# Patient Record
Sex: FEMALE | Race: WHITE
[De-identification: names, ages, dates, MRNs, and addresses within clinical notes are randomized per-mention and may not be internally consistent; named-entity substitution may affect disease eponyms.]

---

## 2017-03-22 ENCOUNTER — HOSPITAL ENCOUNTER (OUTPATIENT)
Dept: HOSPITAL 45 - C.GI | Age: 27
Discharge: HOME | End: 2017-03-22
Attending: INTERNAL MEDICINE
Payer: COMMERCIAL

## 2017-03-22 VITALS
WEIGHT: 156.33 LBS | BODY MASS INDEX: 26.05 KG/M2 | BODY MASS INDEX: 26.05 KG/M2 | WEIGHT: 156.33 LBS | HEIGHT: 65 IN | HEIGHT: 65 IN | BODY MASS INDEX: 26.05 KG/M2

## 2017-03-22 VITALS — OXYGEN SATURATION: 100 % | HEART RATE: 76 BPM | DIASTOLIC BLOOD PRESSURE: 67 MMHG | SYSTOLIC BLOOD PRESSURE: 113 MMHG

## 2017-03-22 DIAGNOSIS — K62.5: Primary | ICD-10-CM

## 2017-03-22 DIAGNOSIS — K64.8: ICD-10-CM

## 2017-03-22 NOTE — GI REPORT
Procedure Date: 3/22/2017 8:46 AM

Procedure:            Colonoscopy

Indications:          Rectal bleeding

Medicines:            Monitored Anesthesia Care

Complications:        No immediate complications.

Estimated Blood Loss: Estimated blood loss: none.

Procedure:            Pre-Anesthesia Assessment:

                      - Prior to the procedure, a History and Physical was 

                      performed, and patient medications and allergies were 

                      reviewed. The patient's tolerance of previous 

                      anesthesia was also reviewed. The risks and benefits of 

                      the procedure and the sedation options and risks were 

                      discussed with the patient. All questions were 

                      answered, and informed consent was obtained. Prior 

                      Anticoagulants: The patient has taken no previous 

                      anticoagulant or antiplatelet agents. ASA Grade 

                      Assessment: I - A normal, healthy patient. After 

                      reviewing the risks and benefits, the patient was 

                      deemed in satisfactory condition to undergo the 

                      procedure.

                      After I obtained informed consent, the scope was passed 

                      under direct vision. Throughout the procedure, the 

                      patient's blood pressure, pulse, and oxygen saturations 

                      were monitored continuously. The scope was introduced 

                      through the anus and advanced to the terminal ileum. 

                      The colonoscopy was performed without difficulty. The 

                      patient tolerated the procedure well. The quality of 

                      the bowel preparation was good. The terminal ileum, 

                      ileocecal valve, appendiceal orifice, and rectum were 

                      photographed.

Findings:

     Non-bleeding internal hemorrhoids were found during retroflexion. The 

     hemorrhoids were small.

     The exam was otherwise without abnormality.

Impression:           - Non-bleeding internal hemorrhoids.

                      - The examination was otherwise normal.

                      - No specimens collected.

Recommendation:       - Resume previous diet.

                      - Continue present medications.

                      - Repeat colonoscopy at age 49 for surveillance.

                      - Return to primary care physician as previously 

                      scheduled.

Garett Amaro DO

3/22/2017 9:06:08 AM

This report has been signed electronically.

Note Initiated On: 3/22/2017 8:46 AM

     I attest to the content of the Intraoperative Record and orders 

     documented therein, exceptions below

## 2017-03-22 NOTE — DISCHARGE INSTRUCTIONS
Endoscopy Patient Instructions


Date / Procedure(s) Performed


Mar 22, 2017.


Colonoscopy





Allergy Information


Coded Allergies:  


     No Known Allergies (Verified , 3/22/17)





Discharge Date / Findings


Mar 22, 2017.


Internal hemorrhoids





Medication Instructions


OK to resume all medications today as prescribed


 Reported Home Medications








 Medications  Dose


 Route/Sig


 Max Daily Dose Days Date Category


 


 Probiotic


  (Probiotic


 Product) 1 Tab Tab  1 Tab


 PO QPM


    3/13/17 Reported


 


 Multivitamin


  (Multivitamins)


 Tab  1 Tab


 PO QPM


    3/13/17 Reported











Provider Instructions





Activity Restrictions





-  No exercising or heavy lifting for 24 hours. 


-  Do not drink alcohol the day of the procedure.


-  Do not drive a car or operate machinery until the day after the procedure.


-  Do not make any important decisions or sign important papers in 24 hours 

after the procedure.





Following Day:





-  Return to full activity which may include returning to work/school.





Diet





Start your diet with liquids and light foods (jello, soup, juice, toast).  Then 

eat your usual diet if not nauseated.





Treatment For Common After Affects





For mild abdominal pain, bloating, or excessive gas:





-  Rest


-  Eat lightly


-  Lie on right side





Follow-Up Information


Follow-up with DR. VALDO MORELAND as scheduled





Anesthesia Information





What You Should Know





You have had a procedure that required some medicine to reduce anxiety and 

discomfort. This treatment is called moderate sedation.  


After receiving the treatment, you may be sleepy, but you will be able to 

breathe on your own.  The effects of the treatment may last for several hours.








Follow these instructions along with Activity/Diet recommendations noted above:





*  Do NOT do anything where dizziness or clumsiness would be dangerous.





*  Rest quietly at home today, then you can be up and about tomorrow.





*  Have a responsible person stay with you the rest of today.





*  You may have had an I.V. today.  If so, you may take the dressing off later 

today.





Recommendations


 


Call your doctor if:





*  Trouble breathing 





*  Continuous vomiting for more than 24 hours





*  Temperature above 101 degrees





*  Severe abdominal pain or bloating





*  Pain not relieved by pain medicine ordered





*  There is increased drainage or redness from any incision





*  A large amount of rectal bleeding greater than 2-3 tablespoons. 


   (If you had a polyp/s removed or have hemorrhoids, a small amount of blood -


    from the rectum is to be expected.)





*  You have any unanswered questions or concerns.





IN THE EVENT OF A SERIOUS EMERGENCY, GO TO THE NEAREST EMERGENCY ROOM





       Your discharge instructions were prepared by provider Garett Amaro.


 Patient Instructions Signature Page








Crystal Quresih 











Patient (or Guardian) Signature/Date:____________________________________ I 

have read and understand the instructions given to me by my caregivers.








Caregiver/RN/Doctor Signature/Date:____________________________________








The above-named patient and/or guardian has received patient instructions on 

this date.


























+  Original Patient Signature Page (only) stays with chart.  Please make copy 

for patient.

## 2017-03-22 NOTE — ENDO HISTORY AND PHYSICAL
History & Physical


Date of Service:


Mar 22, 2017.


Chief Complaint:


BLOOD IN STOOL, GROSS BLOOD ON DIGITAL IMAGE


Referring Physician:


DR. VALDO MORELAND


History of Present Illness


25 yo CF who presents for colonoscopy secondary to blood in stool.





Past Surgical History


Hx Cardiac Surgery:  No


Hx Internal Defibrillator:  No


Hx Pacemaker:  No


Hx Abdominal Surgery:  No


Hx of Implantable Prosthesis:  No


Hx Post-Op Nausea and Vomiting:  No


Hx Cancer Surgery:  No


Hx Thoracic Surgery:  No


Hx Orthopedic:  No


Hx Urinary Tract Surgery:  No





Family History


Polyp





Social History


Smoking Status:  Never Smoker


Hx Substance Use:  No


Hx Alcohol Use:  No





Allergies


Coded Allergies:  


     No Known Allergies (Verified , 3/22/17)





Current Medications





 Reported Home Medications








 Medications  Dose


 Route/Sig


 Max Daily Dose Days Date Category


 


 Probiotic


  (Probiotic


 Product) 1 Tab Tab  1 Tab


 PO QPM


    3/13/17 Reported


 


 Multivitamin


  (Multivitamins)


 Tab  1 Tab


 PO QPM


    3/13/17 Reported











Vital Signs


Weight (Kilograms):  70.91


Height (Feet):  5


Height (Inches):  5











  Date Time  Temp Pulse Resp B/P Pulse Ox O2 Delivery O2 Flow Rate FiO2


 


3/22/17 08:39 36.6 89 18 141/83 99 Room Air  











Physical Exam


General Appearance:  WD/WN, no apparent distress


Respiratory/Chest:  


   Auscultation:  breath sounds normal


Cardiovascular:  


   Heart Auscultation:  RRR


Abdomen:  


   Bowel Sounds:  normal


   Inspection & Palpation:  soft, non-distended, no tenderness, guarding & 

rebound





Assessment and Plan


Assessment:


25 yo CF who presents for colonoscopy secondary to blood in stool.








Plan:


Proceed with colonoscopy.

## 2017-03-22 NOTE — ANESTHESIOLOGY PROGRESS NOTE
Anesthesia Post Op Note


Date & Time


Mar 22, 2017 at 15:12





Vital Signs


Pain Intensity:  0





 Vital Signs Past 12 Hours








  Date Time  Temp Pulse Resp B/P Pulse Ox O2 Delivery O2 Flow Rate FiO2


 


3/22/17 09:35  76 16 113/67 100 Room Air  


 


3/22/17 09:20  78 16 119/73 100 Room Air  


 


3/22/17 09:05  82 16 114/67 97 Room Air  


 


3/22/17 08:39 36.6 89 18 141/83 99 Room Air  











Notes


Mental Status:  alert / awake / arousable, participated in evaluation


Pt Amnestic to Procedure:  Yes


Nausea / Vomiting:  adequately controlled


Pain:  adequately controlled


Airway Patency, RR, SpO2:  stable & adequate


BP & HR:  stable & adequate


Hydration State:  stable & adequate


Anesthetic Complications:  no major complications apparent

## 2017-04-07 ENCOUNTER — HOSPITAL ENCOUNTER (OUTPATIENT)
Dept: HOSPITAL 45 - C.RAD | Age: 27
Discharge: HOME | End: 2017-04-07
Attending: NURSE PRACTITIONER
Payer: COMMERCIAL

## 2017-04-07 ENCOUNTER — HOSPITAL ENCOUNTER (OUTPATIENT)
Dept: HOSPITAL 45 - C.CTS | Age: 27
Discharge: HOME | End: 2017-04-07
Attending: NURSE PRACTITIONER
Payer: COMMERCIAL

## 2017-04-07 DIAGNOSIS — R10.9: Primary | ICD-10-CM

## 2017-04-11 ENCOUNTER — HOSPITAL ENCOUNTER (OUTPATIENT)
Dept: HOSPITAL 45 - C.ACU | Age: 27
Discharge: HOME | End: 2017-04-11
Attending: SURGERY
Payer: COMMERCIAL

## 2017-04-11 VITALS
OXYGEN SATURATION: 99 % | DIASTOLIC BLOOD PRESSURE: 75 MMHG | SYSTOLIC BLOOD PRESSURE: 127 MMHG | HEART RATE: 61 BPM | TEMPERATURE: 98.78 F

## 2017-04-11 VITALS
BODY MASS INDEX: 25.34 KG/M2 | HEIGHT: 65 IN | WEIGHT: 152.12 LBS | BODY MASS INDEX: 25.34 KG/M2 | HEIGHT: 65 IN | BODY MASS INDEX: 25.34 KG/M2 | WEIGHT: 152.12 LBS

## 2017-04-11 VITALS — HEART RATE: 68 BPM | DIASTOLIC BLOOD PRESSURE: 79 MMHG | SYSTOLIC BLOOD PRESSURE: 127 MMHG | OXYGEN SATURATION: 99 %

## 2017-04-11 VITALS
TEMPERATURE: 98.6 F | OXYGEN SATURATION: 99 % | HEART RATE: 88 BPM | SYSTOLIC BLOOD PRESSURE: 132 MMHG | DIASTOLIC BLOOD PRESSURE: 91 MMHG

## 2017-04-11 VITALS
DIASTOLIC BLOOD PRESSURE: 75 MMHG | OXYGEN SATURATION: 98 % | TEMPERATURE: 98.78 F | HEART RATE: 76 BPM | SYSTOLIC BLOOD PRESSURE: 126 MMHG

## 2017-04-11 DIAGNOSIS — K35.80: Primary | ICD-10-CM

## 2017-04-11 DIAGNOSIS — Z83.3: ICD-10-CM

## 2017-04-11 DIAGNOSIS — K38.9: ICD-10-CM

## 2017-04-11 DIAGNOSIS — Z98.818: ICD-10-CM

## 2017-04-11 LAB
EOSINOPHIL NFR BLD AUTO: 253 K/UL (ref 130–400)
HCT VFR BLD CALC: 43 % (ref 37–47)
MCH RBC QN AUTO: 28 PG (ref 25–34)
MCHC RBC AUTO-ENTMCNC: 35.1 G/DL (ref 32–36)
MCV RBC AUTO: 79.6 FL (ref 80–100)
PMV BLD AUTO: 9.6 FL (ref 7.4–10.4)
RBC # BLD AUTO: 5.4 M/UL (ref 4.2–5.4)
WBC # BLD AUTO: 6.36 K/UL (ref 4.8–10.8)

## 2017-04-11 RX ADMIN — FENTANYL CITRATE PRN MCG: 50 INJECTION, SOLUTION INTRAMUSCULAR; INTRAVENOUS at 13:12

## 2017-04-11 RX ADMIN — FENTANYL CITRATE PRN MCG: 50 INJECTION, SOLUTION INTRAMUSCULAR; INTRAVENOUS at 13:19

## 2017-04-11 RX ADMIN — FENTANYL CITRATE PRN MCG: 50 INJECTION, SOLUTION INTRAMUSCULAR; INTRAVENOUS at 13:24

## 2017-04-11 NOTE — [facility] OPERATIVE REPORT
Operative Report


Operative Date


Apr 11, 2017.





Pre-Operative Diagnosis





Appendicitis





Post-Operative Diagnosis


same





Procedure(s) Performed


lap appy





Surgeon


Dr. Burt





Assistant Surgeon(s)


Raghu Segal PA-C





Estimated Blood Loss


10mL





Findings


mild acute appendicitis





Specimens





A: Appendix





Anesthesia


get





Complication(s)


None





Disposition


Recovery Room / PACU


I attest to the content of the Intraoperative Record and any orders documented 

therein.  Any exceptions are noted below.

## 2017-04-11 NOTE — ANESTHESIOLOGY PROGRESS NOTE
Anesthesia Post Op Note


Date & Time


Apr 11, 2017 at 14:07





Vital Signs


Pain Intensity:  2





 Vital Signs Past 12 Hours








  Date Time  Temp Pulse Resp B/P Pulse Ox O2 Delivery O2 Flow Rate FiO2


 


4/11/17 14:00  78 18 124/74 95 Room Air  


 


4/11/17 13:45  62 18 130/82 95 Room Air  


 


4/11/17 13:35 36.7 68 18 131/81 98 Room Air  


 


4/11/17 13:25  60 12 128/78 95 Room Air  


 


4/11/17 13:15  61 17 124/81 100 Mask 10 


 


4/11/17 13:05  70 19 127/81 100 Mask 10 


 


4/11/17 12:59 36.3 76 16 129/81 100 Mask 10 


 


4/11/17 11:15 37 88 20 132/91 99 Room Air  











Notes


Mental Status:  alert / awake / arousable, participated in evaluation


Pt Amnestic to Procedure:  Yes


Nausea / Vomiting:  adequately controlled


Pain:  adequately controlled


Airway Patency, RR, SpO2:  stable & adequate


BP & HR:  stable & adequate


Hydration State:  stable & adequate


Anesthetic Complications:  no major complications apparent


Pt doing well.

## 2017-04-11 NOTE — OPERATIVE REPORT
DATE OF OPERATION:  04/11/2017

 

PREOPERATIVE DIAGNOSIS:  Acute appendicitis with appendicolith.

 

POSTOPERATIVE DIAGNOSIS:  Same.

 

PROCEDURE:  Laparoscopic appendectomy.

 

SURGEON:  Dr. Burt.

 

ASSISTANT:  Raghu Segal PA-C.

 

ESTIMATED BLOOD LOSS:  10 mL

 

COMPLICATIONS:  No immediate.

 

ANESTHESIA:  General.

 

The patient tolerated the procedure well.

 

OPERATIVE NOTE:  After informed consent was obtained, the patient was taken

to the operating suite and placed in supine position.  After successful

intubation, a Jimenez catheter was placed and the abdomen was sterilely prepped

and draped in usual fashion.  A periumbilical incision made with an 11 blade

scalpel and carried down through the soft tissue using electrocautery. 

Anterior rectus fascia was opened using electrocautery and two #0 Vicryl stay

sutures were placed.  Peritoneum was entered using blunt finger penetration

and a finger sweep was performed.  A 12 mm Ivonne trocar was placed and the

abdomen was insufflated to 18 mmHg.  Laparoscope was inserted and the abdomen

examined 360 degrees.  A suprapubic 5 mm port and a left lower quadrant 12 mm

port were placed under direct vision.  There was a little bit of free fluid

in the pelvis but it was serous.  There was also a cyst on the right lobe of

the liver as seen on CAT scan consistent with her hemangioma.  All other

anatomy appeared normal.  Our focus turned to the right lower quadrant.  Her

appendix was readily identified and in the mid portion there was actually a

thickened, inflamed area, most likely where the appendicolith is located. 

The appendix itself was somewhat stiff and was consistent with an early or

mild acute appendicitis.  Because it was not that thickened, we were able to

transect the appendix at the base of the cecum and the mesoappendix all with

one firing of a ALEKSANDAR tan cartridge 60 mm stapler.  There was adequate

hemostasis and the staple line looked good at the end of the case.  We did

suction out the free fluid in the pelvis.  I also ran the small-bowel from

the terminal ileum backward for several feet and saw no evidence of any other

gross pathology.  A quick look around the abdomen showed no other issue.  The

bleeding at the staple line was controlled and we placed the appendix into a

bag and removed it from left lower quadrant incision.  All the trocars were

then removed and the abdomen desufflated.  The fascia of the camera port was

closed using 0 Vicryl in figure-of-eight fashion and the fascia of the left

lower quadrant incision was closed with a simple interrupted 0 Vicryl stitch.

 Wounds were irrigated and closed with 4-0 Monocryl.  Marcaine was injected

around them for postoperative analgesia and skin glue used as dressing.  The

patient was awakened, extubated, and transferred to recovery in stable

condition.

 

 

I attest to the content of the Intraoperative Record and any orders documented therein. Any exceptio
ns are noted below.

## 2017-04-11 NOTE — DISCHARGE INSTRUCTIONS
Discharge Instructions


Date of Service


Apr 11, 2017.





Admission


Reason for Admission:  Acute Appendicitis, Stone In Appendix





Discharge


Discharge Diagnosis / Problem:  same





Discharge Goals


Goal(s):  Decrease discomfort, Improve function





Activity Recommendations


Activity Limitations:  as noted below


Lifting Limitations:  no more than 10 pounds


Exercise/Sports Limitations:  until after follow-up appointment


May Resume Sexual Activity:  after follow-up appointment


Shower/Bathe:  tomorrow





.





Instructions / Follow-Up


Instructions / Follow-Up


f/u with dr. burt in 1-2 weeks. call [phone] if any problems.





Current Hospital Diet


Patient's current hospital diet:





Discharge Diet


Recommended Diet:  Regular Diet





Procedures


Procedures Performed:  


lap appy





Pending Studies


Studies pending at discharge:  yes


List of pending studies:  


path report





Medical Emergencies








.


Who to Call and When:





Medical Emergencies:  If at any time you feel your situation is an emergency, 

please call 911 immediately.





.





Non-Emergent Contact


Non-Emergency issues call your:  Surgeon


Call Non-Emergent contact if:  temperature is above 101, wound has increased 

drainage, wound has increased redness, wound has increased pain


.





"Provider Documentation" section prepared by Damion Burt.





VTE Core Measure


Inpt VTE Proph given/why not?:  SCD's

## 2017-07-07 ENCOUNTER — HOSPITAL ENCOUNTER (OUTPATIENT)
Dept: HOSPITAL 45 - C.LABSPEC | Age: 27
Discharge: HOME | End: 2017-07-07
Attending: OBSTETRICS & GYNECOLOGY
Payer: COMMERCIAL

## 2017-07-07 DIAGNOSIS — Z34.90: Primary | ICD-10-CM

## 2017-07-07 LAB
APPEARANCE UR: CLEAR
BILIRUB UR-MCNC: (no result) MG/DL
COLOR UR: YELLOW
MANUAL MICROSCOPIC REQUIRED?: NO
NITRITE UR QL STRIP: (no result)
PH UR STRIP: 5.5 [PH] (ref 4.5–7.5)
REVIEW REQ?: NO
SP GR UR STRIP: 1.02 (ref 1–1.03)
URINE BILL WITH OR WITHOUT MIC: (no result)
UROBILINOGEN UR-MCNC: (no result) MG/DL

## 2017-07-14 ENCOUNTER — HOSPITAL ENCOUNTER (OUTPATIENT)
Dept: HOSPITAL 45 - C.LABSPEC | Age: 27
Discharge: HOME | End: 2017-07-14
Attending: OBSTETRICS & GYNECOLOGY
Payer: COMMERCIAL

## 2017-07-14 ENCOUNTER — HOSPITAL ENCOUNTER (OUTPATIENT)
Dept: HOSPITAL 45 - C.LAB | Age: 27
Discharge: HOME | End: 2017-07-14
Attending: OBSTETRICS & GYNECOLOGY
Payer: COMMERCIAL

## 2017-07-14 DIAGNOSIS — Z34.90: Primary | ICD-10-CM

## 2017-07-14 LAB
BASOPHILS # BLD: 0.01 K/UL (ref 0–0.2)
BASOPHILS NFR BLD: 0.2 %
CHOLEST/HDLC SERPL: 2.8 {RATIO}
COMPLETE: YES
EOSINOPHIL NFR BLD AUTO: 242 K/UL (ref 130–400)
GLUCOSE UR QL: 56 MG/DL
HCT VFR BLD CALC: 41.7 % (ref 37–47)
IG%: 0.3 %
IMM GRANULOCYTES NFR BLD AUTO: 25.9 %
KETONES UR QL STRIP: 87 MG/DL
LYMPHOCYTES # BLD: 1.71 K/UL (ref 1.2–3.4)
MCH RBC QN AUTO: 27.5 PG (ref 25–34)
MCHC RBC AUTO-ENTMCNC: 33.6 G/DL (ref 32–36)
MCV RBC AUTO: 81.8 FL (ref 80–100)
MONOCYTES NFR BLD: 8 %
NEUTROPHILS # BLD AUTO: 0.8 %
NEUTROPHILS NFR BLD AUTO: 64.8 %
NITRITE UR QL STRIP: 69 MG/DL (ref 0–150)
PH UR: 157 MG/DL (ref 0–200)
PMV BLD AUTO: 10.8 FL (ref 7.4–10.4)
RBC # BLD AUTO: 5.1 M/UL (ref 4.2–5.4)
VERY LOW DENSITY LIPOPROT CALC: 14 MG/DL
WBC # BLD AUTO: 6.6 K/UL (ref 4.8–10.8)

## 2017-07-18 LAB
CHLAMYDIA TRACH RNA***: NOT DETECTED
GC (NEIS GONORRHOEAE)RNA**: NOT DETECTED

## 2017-08-08 NOTE — DIAGNOSTIC IMAGING REPORT
ABDOMEN AND PELVIS CT WITHOUT CONTRAST



CT DOSE: 362.11 mGy.cm



HISTORY: Pelvic calcifications  R10.9 Abdominal pain



TECHNIQUE: Multiaxial CT images of the abdomen and pelvis were performed without

contrast.



COMPARISON STUDY: Abdominal series dated 4/7/2017



FINDINGS: The lung bases are clear. The unenhanced liver, spleen, gallbladder,

pancreas, kidneys, and adrenal glands are within normal limits. No bowel wall

thickening or obstruction. The pelvic organs are unremarkable. No suspicious

lytic or blastic osseous lesions.



The kidneys are considered negative for calcification or hydronephrosis. Mild

mesenteric adenitis.



Bowel pattern is nonobstructive.



Appendix is slightly distended at 7 mm. And contains a 6 x 3 mm appendicolith.



Low-grade appendicitis is not excluded.



Several faint calcifications left and to a lesser extent right soft tissue

pelvis which appear to be vascular. No evidence for an obstructing urinary tract

calculus. Uterus is retroflexed.



IMPRESSION: 



1. Findings consistent with a low-grade appendicitis with a contained

appendicolith.

2. Appendix is a maximum diameter of 7.5 mm with a trace amount of

periappendiceal infiltrative change.





3. No evidence for abscess collection or obstruction.

4. No evidence for an obstructing urinary tract calculus. 







Electronically signed by:  Bruno Alicea M.D.

4/7/2017 3:38 PM



Dictated Date/Time:  4/7/2017 3:31 PM Detail Level: Generalized Detail Level: Detailed Detail Level: Zone

## 2017-09-15 ENCOUNTER — HOSPITAL ENCOUNTER (OUTPATIENT)
Dept: HOSPITAL 45 - C.LAB1850 | Age: 27
Discharge: HOME | End: 2017-09-15
Attending: OBSTETRICS & GYNECOLOGY
Payer: COMMERCIAL

## 2017-09-15 DIAGNOSIS — K62.5: ICD-10-CM

## 2017-09-15 DIAGNOSIS — Z34.81: Primary | ICD-10-CM

## 2017-09-15 LAB
BASOPHILS # BLD: 0.01 K/UL (ref 0–0.2)
BASOPHILS NFR BLD: 0.1 %
COMPLETE: YES
EOSINOPHIL NFR BLD AUTO: 235 K/UL (ref 130–400)
GTGD: 50 GRAMS
HCT VFR BLD CALC: 37.4 % (ref 37–47)
IG%: 0.3 %
IMM GRANULOCYTES NFR BLD AUTO: 26.6 %
LYMPHOCYTES # BLD: 2.08 K/UL (ref 1.2–3.4)
MCH RBC QN AUTO: 28.5 PG (ref 25–34)
MCHC RBC AUTO-ENTMCNC: 34 G/DL (ref 32–36)
MCV RBC AUTO: 83.9 FL (ref 80–100)
MONOCYTES NFR BLD: 6.4 %
NEUTROPHILS # BLD AUTO: 1.7 %
NEUTROPHILS NFR BLD AUTO: 64.9 %
PMV BLD AUTO: 10.3 FL (ref 7.4–10.4)
RBC # BLD AUTO: 4.46 M/UL (ref 4.2–5.4)
WBC # BLD AUTO: 7.82 K/UL (ref 4.8–10.8)

## 2017-09-23 ENCOUNTER — HOSPITAL ENCOUNTER (OUTPATIENT)
Dept: HOSPITAL 45 - C.LAB | Age: 27
Discharge: HOME | End: 2017-09-23
Attending: OBSTETRICS & GYNECOLOGY
Payer: COMMERCIAL

## 2017-09-23 DIAGNOSIS — O28.1: Primary | ICD-10-CM

## 2017-09-23 DIAGNOSIS — Z3A.00: ICD-10-CM

## 2018-01-26 ENCOUNTER — HOSPITAL ENCOUNTER (OUTPATIENT)
Dept: HOSPITAL 45 - C.LABSPEC | Age: 28
Discharge: HOME | End: 2018-01-26
Attending: OBSTETRICS & GYNECOLOGY
Payer: COMMERCIAL

## 2018-01-26 DIAGNOSIS — Z34.93: Primary | ICD-10-CM

## 2018-02-15 ENCOUNTER — HOSPITAL ENCOUNTER (INPATIENT)
Dept: HOSPITAL 45 - C.OPB | Age: 28
LOS: 2 days | Discharge: HOME | End: 2018-02-17
Attending: OBSTETRICS & GYNECOLOGY | Admitting: OBSTETRICS & GYNECOLOGY
Payer: COMMERCIAL

## 2018-02-15 VITALS
BODY MASS INDEX: 29.02 KG/M2 | HEIGHT: 65 IN | BODY MASS INDEX: 29.02 KG/M2 | HEIGHT: 65 IN | WEIGHT: 174.17 LBS | WEIGHT: 174.17 LBS

## 2018-02-15 VITALS — TEMPERATURE: 97.52 F | HEART RATE: 87 BPM | SYSTOLIC BLOOD PRESSURE: 121 MMHG | DIASTOLIC BLOOD PRESSURE: 77 MMHG

## 2018-02-15 PROCEDURE — 0KQM0ZZ REPAIR PERINEUM MUSCLE, OPEN APPROACH: ICD-10-PCS | Performed by: OBSTETRICS & GYNECOLOGY

## 2018-02-15 RX ADMIN — Medication PRN MG: at 20:56

## 2018-02-15 NOTE — DELIVERY SUMMARY
DATE OF OPERATION:  02/15/2018

 

FINDINGS:  Viable female infant with Apgars of 8 and 9.  The baby delivered

spontaneously over a midline second-degree laceration.  Cord blood samples

obtained.  Placenta delivered spontaneously.  The laceration repaired with

4-0 Vicryl in a routine fashion.  Estimated blood loss 300 mL.

 

LABOR NOTE:  The patient is a 27-year-old  2, para 1 with an EDC of  by dates and first trimester ultrasound who presented to labor and

delivery in active labor.  The patient had been having prodromal contractions

throughout the day but they had increased in intensity.  She had spontaneous

rupture of membranes at approximately 1900 hours and contractions became very

strong.  The patient had a benign prenatal course.  Her blood type is A

positive, antibody negative, rubella immune, hepatitis B negative.  She had

an elevated 1-hour Glucola at 16 weeks with normal 2-hour glucose tolerances

at both 16 and 28 weeks.  She had a negative third trimester beta strep

culture.  Upon admission, the patient was in active labor.  Cervical

examination showed the patient to be fully dilated and she uncontrollably

began to push.  The patient pushed for approximately 10 minutes delivering

the viable female infant with description as above.  Cord was clamped and

cut.  Cord blood samples were obtained.  Placenta delivered spontaneously. 

Midline laceration was repaired under local anesthesia with 4-0 Vicryl in a

routine fashion.  Sponge and needle count was correct.  Estimated blood loss

300 mL.

 

 

I attest to the content of the Intraoperative Record and any orders documented therein. Any exception
s are noted below.

## 2018-02-16 VITALS — HEART RATE: 77 BPM | DIASTOLIC BLOOD PRESSURE: 73 MMHG | TEMPERATURE: 98.06 F | SYSTOLIC BLOOD PRESSURE: 110 MMHG

## 2018-02-16 VITALS — TEMPERATURE: 97.88 F | SYSTOLIC BLOOD PRESSURE: 127 MMHG | DIASTOLIC BLOOD PRESSURE: 79 MMHG | HEART RATE: 85 BPM

## 2018-02-16 VITALS
TEMPERATURE: 97.88 F | SYSTOLIC BLOOD PRESSURE: 100 MMHG | HEART RATE: 65 BPM | OXYGEN SATURATION: 97 % | DIASTOLIC BLOOD PRESSURE: 71 MMHG

## 2018-02-16 VITALS — HEART RATE: 76 BPM | DIASTOLIC BLOOD PRESSURE: 80 MMHG | TEMPERATURE: 98.06 F | SYSTOLIC BLOOD PRESSURE: 131 MMHG

## 2018-02-16 VITALS — DIASTOLIC BLOOD PRESSURE: 81 MMHG | HEART RATE: 71 BPM | TEMPERATURE: 97.88 F | SYSTOLIC BLOOD PRESSURE: 122 MMHG

## 2018-02-16 VITALS — TEMPERATURE: 97.88 F | SYSTOLIC BLOOD PRESSURE: 118 MMHG | HEART RATE: 60 BPM | DIASTOLIC BLOOD PRESSURE: 72 MMHG

## 2018-02-16 LAB
HCT VFR BLD CALC: 35.4 % (ref 37–47)
HGB BLD-MCNC: 12.3 G/DL (ref 12–16)

## 2018-02-16 RX ADMIN — Medication SCH TAB: at 08:09

## 2018-02-16 RX ADMIN — Medication PRN MG: at 02:31

## 2018-02-16 RX ADMIN — FERROUS SULFATE TAB EC 325 MG (65 MG FE EQUIVALENT) SCH MG: 325 (65 FE) TABLET DELAYED RESPONSE at 08:10

## 2018-02-16 RX ADMIN — DOCUSATE SODIUM SCH MG: 100 CAPSULE, LIQUID FILLED ORAL at 08:09

## 2018-02-16 RX ADMIN — Medication PRN MG: at 18:57

## 2018-02-16 RX ADMIN — DOCUSATE SODIUM SCH MG: 100 CAPSULE, LIQUID FILLED ORAL at 20:30

## 2018-02-16 NOTE — DISCHARGE INSTRUCTIONS
Discharge Instructions


Date of Service


Feb 16, 2018.





Admission


Reason for Admission:  Supervision Or Normal Intruaterine Pregnancy





Discharge


Discharge Diagnosis / Problem:  Vaginal Delivery





Discharge Goals


Goal(s):  Routine recovery after delivery





Medications


Continue Dispensed Medications:  supercream, dermaplast, tucks, lansinoh





Activity Recommendations


Activity Limitations:  per Instructions/Follow-up section





.





Instructions / Follow-Up


Instructions / Follow-Up





ACTIVITY RECOMMENDATIONS:





* Gradual return to full activity over the next 2-3 weeks.


* No lifting - nothing heavier than baby over the next 2-3 weeks.


* Do not engage in vigorous exercise, sexual activity or sports until cleared by


   your physician.


* Do not drive or operate any motorized equipment until cleared by your 

physician.


* You may shower/bathe daily.








MEDICATIONS:





For discomfort or pain, you may use Acetaminophen (Tylenol), Ibuprofen (Advil),


or Naproxen (Aleve) following the package directions. For constipation you may 


use Colace following the package directions.








BREAST CARE:





If you are not breast feeding:





*  Wear a supportive bra 24 hours a day for one to two weeks.


*  Avoid stimulating your breasts and nipples as much as possible during the 

first 


    few weeks after delivery.


*  When taking a shower, have the warm water hit your back, not breasts.


*  When your breasts feel full, apply ice packs.  Usually three to four times a 

day


    helps ease the discomfort.


*  Take a mild pain medication (Tylenol / Motrin) when you are uncomfortable.





If breast feeding:





*  Use breast milk to lubricate nipples.  Lansinoh cream may be used for sore 

nipples. 


    You do not need to remove cream prior to breast feeding.  If using a 

different brand


   of cream, check the label for directions regarding removal of cream prior to 

nursing.


*  Wear a supportive bra.


*  If having problems with breasts or breast feeding, call a lactation 

consultant 


    or your health care provider.








EPISIOTOMY CARE:





After delivery, if you have an episiotomy (stitches), the following steps will 

ease


discomfort and aid healing.





*  For the first 24 hours after delivery, place ice packs next to your 

episiotomy to


   help reduce swelling.


*  After the first 24 hour-period, sitz baths, either portable or in the tub, 

are suggested.


    A shower with a shower arm sprayed over the episiotomy may be comforting.


*  Cristal care should be done after each voiding and bowel movement.  Squirt warm 

water


    from a plastic bottle over the perineum (region of the body between the 

anus and 


    urinary opening) and pat dry.


*  Use Dermoplast to ease discomfort.  Shake container.  Spray directly over 

the 


    episiotomy.  Place a Tucks on a clean sanitary pad next to your episiotomy.








SPECIAL CARE INSTRUCTIONS:





When you are discharged from the hospital, it is important for you to follow 

the instructions 


listed below:





*  During the first week at home, you should be able to care for yourself and 

your baby.


    In addition, the usual light household activities are encouraged.





*  Limit your activities to the way you feel.  Do not try to clean the house or 

move 


    furniture. Be sensible.





*  If you actively engage in sports and have done so up until the time of your 

delivery, 


    you may resume these activities as soon as you feel able.  This may take up 

to one 


    month or even longer.  Use good judgment.





*  Continue to take your prenatal vitamins for at least six weeks after the 

birth of your baby.





*  Your diet need not be limited unless you were on a special diet before your 

delivery.  


    Breast-feeding mothers need around 2500 calories per day and at least 64-80 

ounces of 


    fluid per day (8 to 10 glasses).





*  You should eat foods from the four major food groups.  Crash diets or fad 

diets are to be 


    avoided.  Eating lean meats, fresh fruits and vegetables, low-fat dairy 

products, high fiber


    foods and a regular exercise program, will help you get back to your pre-

pregnancy weight


    without putting your health at risk.





*  Constipation is sometimes a problem after delivery.  Take a mild laxative as 

needed.  If 


    breast feeding, Milk of Magnesia is acceptable to use. You may use a 

suppository or Fleets


    enema if no episiotomy.





*  A daily shower or tub bath is suggested.  Be sure to thoroughly and gently 

dry the perineum.





*  A bloody vaginal discharge will usually continue until around four weeks 

post partum.  A 


    small amount of bleeding may continue for as long as six weeks.  Vaginal 

discharge changes


    from the bright red bleeding after delivery to pink then brownish and 

finally yellowish-pink 


    before becoming white and disappearing.





*  Bleeding may increase with activity.  Your first period may come in 4-8 

weeks.  If you are 


    breast feeding, your period may be delayed even longer.





*  [place] (sex) can begin whenever both you and your partner feel 

comfortable and do 


    not have any form of genital infection.  It is recommended that you wait at 

least six weeks


    for internal and external healing to occur.  If you have questions, please 

talk to your health


    care practitioner.  A condom should be used to prevent infection and 

pregnancy.





*  Foreplay, gentle intercourse and lubrication is very important the first 

several times to 


    prevent pain.  A water-based lubricant such as K-Y jelly or Astroglide may 

be used.





*  If you have RH negative blood and your baby is RH positive, you will receive 

RHOGAM by 


    injection prior to discharge.  The nurse will give you a card to keep with 

you that has the


    date and place that you received RHOGAM after delivery.





*  During your prenatal care, you had a Rubella screen done to check for the 

presence of 


    rubella antibodies in your blood.  If your test was negative, you will 

receive a Rubella 


    vaccine prior to discharge.  This vaccine may cause a fever, soreness at 

the injection site


    and flu-like symptoms.  If these symptoms persist, notify your health care 

practitioner.  


    Pregnancy is not advised for one month after a Rubella vaccine.





*  Verbalizes understanding of car seat law as reviewed with patient nursing.





*  Car Seat hand-out given and reviewed with patient by nursing.





*  Shaken baby information reviewed with patient by nursing.


 


Call you doctor if:





*  Heavy bleeding (saturating several pads an hour) or passing clots the size 

of your fist.


*  A fever >101 degrees F (38.3 degrees C) on two occasions four hours apart and

/or chills.


*  Unusual pain in the pelvic or vaginal areas.


* "Baby Blues" lasting longer than two weeks.





If you have any questions or concerns, call your health care practitioner at 


[phone].








FOLLOW UP VISIT:





*  Please call the office at [phone] to schedule a 6 week postpartum 


    examination.  It is important you keep this appointment.  It is important 


    for you to make arrangements for either yearly or twice yearly check-ups 


    thereafter.





Current Hospital Diet


Patient's current hospital diet: Regular OB Diet





Discharge Diet


Recommended Diet:  Regular Diet





Pending Studies


Studies pending at discharge:  no





Medical Emergencies








.


Who to Call and When:





Medical Emergencies:  If at any time you feel your situation is an emergency, 

please call 911 immediately.





.





Non-Emergent Contact


Non-Emergency issues call your:  Primary Care Provider





.


.








"Provider Documentation" section prepared by Dary Johnson.








.





VTE Core Measure


Inpt VTE Proph given/why not?:  Treatment not indicated

## 2018-02-16 NOTE — PROGRESS NOTE
Subjective


2018.


Subjective


conversation w/ patient (Patient seen and examined at bedside), physical exam


Ambulation:  ambulating normally


Voiding:  no voiding problems


Diet Tolerance:  Regular Diet


Lochia:  Moderate


Feeding Type:  Breast Feeding


Pain:  2/10 cramping pain, improved with analgesia





Review of Systems


Constitutional:  No fever, No chills


Respiratory:  No shortness of breath


Cardiac:  No chest pain


Abdomen:  No nausea, No vomiting


Female :  No dysuria





Objective


Vital Signs











  Date Time  Temp Pulse Resp B/P (MAP) Pulse Ox O2 Delivery O2 Flow Rate FiO2


 


18 03:35 36.7 77 18 110/73 (85)  Room Air  


 


2/15/18 23:00      Room Air  


 


2/15/18 23:00 36.4 87 16 121/77 (92)  Room Air  











Physical Exam


General Appearance:  WELL-APPEARING, WD/WN, NO APPARENT DISTRESS


Respiratory/Chest:  lungs clear, normal breath sounds


Cardiovascular:  regular rate, rhythm


Abdomen:  soft


Fundus:  Firm, Non-Tender, Relation to Umbilicus (2 below)


Extremities:  no calf tenderness





Laboratory Results





Last 24 Hours








Test


  18


05:40


 


Hemoglobin 12.3 g/dL 


 


Hematocrit 35.4 % 











Medications





Current Inpatient Medications








 Medications


  (Trade)  Dose


 Ordered  Sig/Lilian


 Route  Start Time


 Stop Time Status Last Admin


Dose Admin


 


 Oxytocin


  (Pitocin IV)  30 units  UD  PRN


 IV  2/15/18 20:30


 3/17/18 20:29   


 


 


 Benzocaine


  (Dermoplast Aero


 Spr)  1 appln  PRN  PRN


 EXT  2/15/18 20:30


 3/17/18 20:29  18 02:33


1 APPLN


 


 Cocaine HCl


  (Supercream


 0.870% Cr)    BID  PRN


 EXT  2/15/18 20:30


 3/1/18 20:29   


 


 


 Lanolin


  (Lanolin Oint)    PRN  PRN


 EXT  2/15/18 20:30


 3/17/18 20:29   


 


 


 Prenat Multivit/


 [place]/Iron/Folic


 Ac


  (Prenatal


 Vitamin Tab)  1 tab  DAILY


 PO  18 08:00


 3/18/18 07:59   


 


 


 Ibuprofen


  (Motrin Tab)  600 mg  Q4H  PRN


 PO  2/15/18 20:30


 3/17/18 20:29  18 02:31


600 MG


 


 Acetaminophen


  (Tylenol Tab)  650 mg  Q6H  PRN


 PO  2/15/18 20:30


 3/17/18 20:29   


 


 


 Acetaminophen/


 Codeine Phosphate


  (Tylenol w/


 Codeine #3 Tab)  1 tab  Q4H  PRN


 PO  2/15/18 20:30


 3/17/18 20:29  2/15/18 21:21


1 TAB


 


 Acetaminophen/


 Codeine Phosphate


  (Tylenol w/


 Codeine #3 Tab)  2 tab  Q4H  PRN


 PO  2/15/18 20:30


 3/17/18 20:29   


 


 


 Bisacodyl


  (Dulcolax Tab)  5 mg  20


 PO  18 20:00


 18 20:01   


 


 


 Docusate Sodium


  (coLACE CAP)  100 mg  BID


 PO  18 08:00


 3/18/18 07:59   


 


 


 Ferrous Sulfate


  (Feosol Tab)  325 mg  DAILY


 PO  18 08:00


 3/18/18 07:59   


 











Assessment and Plan


Post-Partum


Day#:  1


Continue Routine Care:


27 year old  s/p  NVD day 1


- pt doing well clinically


- A+, Rubella immune, GBS -ve


- vitals reviewed and wnl


- continue to encourage ambulation, breastfeeding and analgesia prn








Resident Physician Supervision Note:





I interviewed and examined the patient. Discussed with Dr. Johnson and agree 

with findings and plan as documented in the note. Any exceptions or 

clarifications are listed here: [None]





Documented By:  Pérez Perez








Resident Tracking


Resident Involvement:  Resident Care Provided


Care Provided:  OB Delivery

## 2018-02-17 VITALS — TEMPERATURE: 97.52 F | HEART RATE: 59 BPM | SYSTOLIC BLOOD PRESSURE: 110 MMHG | DIASTOLIC BLOOD PRESSURE: 65 MMHG

## 2018-02-17 VITALS — DIASTOLIC BLOOD PRESSURE: 65 MMHG | TEMPERATURE: 97.52 F | HEART RATE: 59 BPM

## 2018-02-17 RX ADMIN — FERROUS SULFATE TAB EC 325 MG (65 MG FE EQUIVALENT) SCH MG: 325 (65 FE) TABLET DELAYED RESPONSE at 07:48

## 2018-02-17 RX ADMIN — DOCUSATE SODIUM SCH MG: 100 CAPSULE, LIQUID FILLED ORAL at 07:48

## 2018-02-17 RX ADMIN — Medication SCH TAB: at 07:48

## 2018-02-17 NOTE — PROGRESS NOTE
Subjective


2018.


Subjective


conversation w/ patient (Patient seen and examined at bedside)


Ambulation:  ambulating normally


Voiding:  no voiding problems


Diet Tolerance:  Regular Diet


Lochia:  Moderate


Feeding Type:  Breast Feeding


Pain:  1-2 cramping pain, improved with analgesia





Review of Systems


Constitutional:  No fever, No chills


Respiratory:  No shortness of breath


Cardiac:  No chest pain


Abdomen:  No nausea, No vomiting


Female :  No dysuria





Objective


Vital Signs











  Date Time  Temp Pulse Resp B/P (MAP) Pulse Ox O2 Delivery O2 Flow Rate FiO2


 


18 23:45 36.6 65 18 100/71 (81) 97 Room Air  


 


18 23:45     97 Room Air  


 


18 20:30 36.6 85 18 127/79 (95)  Room Air  


 


18 20:30      Room Air  


 


18 15:30      Room Air  


 


18 15:30 36.6 71 20 122/81 (95)  Room Air  


 


18 12:40 36.7 76 20 131/80 (97)    


 


18 08:00 36.6 60 16 118/72 (87)    











Physical Exam


General Appearance:  WELL-APPEARING, WD/WN, NO APPARENT DISTRESS


Respiratory/Chest:  lungs clear, normal breath sounds


Cardiovascular:  regular rate, rhythm


Abdomen:  soft


Fundus:  Firm, Non-Tender, Relation to Umbilicus (3 below)


Extremities:  no calf tenderness





Medications





Current Inpatient Medications








 Medications


  (Trade)  Dose


 Ordered  Sig/Lilian


 Route  Start Time


 Stop Time Status Last Admin


Dose Admin


 


 Oxytocin


  (Pitocin IV)  30 units  UD  PRN


 IV  2/15/18 20:30


 3/17/18 20:29   


 


 


 Benzocaine


  (Dermoplast Aero


 Spr)  1 appln  PRN  PRN


 EXT  2/15/18 20:30


 3/17/18 20:29  18 02:33


1 APPLN


 


 Cocaine HCl


  (Supercream


 0.870% Cr)    BID  PRN


 EXT  2/15/18 20:30


 3/1/18 20:29   


 


 


 Lanolin


  (Lanolin Oint)    PRN  PRN


 EXT  2/15/18 20:30


 3/17/18 20:29   


 


 


 Prenat Multivit/


 [place]/Iron/Folic


 Ac


  (Prenatal


 Vitamin Tab)  1 tab  DAILY


 PO  18 08:00


 3/18/18 07:59  18 08:09


1 TAB


 


 Ibuprofen


  (Motrin Tab)  600 mg  Q4H  PRN


 PO  2/15/18 20:30


 3/17/18 20:29  18 18:57


600 MG


 


 Acetaminophen


  (Tylenol Tab)  650 mg  Q6H  PRN


 PO  2/15/18 20:30


 3/17/18 20:29  18 06:33


650 MG


 


 Acetaminophen/


 Codeine Phosphate


  (Tylenol w/


 Codeine #3 Tab)  1 tab  Q4H  PRN


 PO  2/15/18 20:30


 3/17/18 20:29  2/15/18 21:21


1 TAB


 


 Acetaminophen/


 Codeine Phosphate


  (Tylenol w/


 Codeine #3 Tab)  2 tab  Q4H  PRN


 PO  2/15/18 20:30


 3/17/18 20:29   


 


 


 Docusate Sodium


  (coLACE CAP)  100 mg  BID


 PO  18 08:00


 3/18/18 07:59  18 20:30


100 MG


 


 Ferrous Sulfate


  (Feosol Tab)  325 mg  DAILY


 PO  18 08:00


 3/18/18 07:59  18 08:10


325 MG











Assessment and Plan


Post-Partum


Day#:  2


Continue Routine Care:


Resident Physician Supervision Note:





I was present with  during the history and exam. I discussed the 

case with the resident and agree with the findings and plan as documented in 

the note.  Any exceptions or clarifications are listed here: [None]





Documented By:  Nai Mccormick


27 year old  s/p  NVD day 2


- pt doing well clinically


- A+, Rubella immune, GBS -ve


- vitals reviewed and wnl


- continue to encourage ambulation, breastfeeding and analgesia prn


- will review d/c instructions as pt ready for discharge today





Resident Tracking


Resident Involvement:  Resident Care Provided


Care Provided:  OB Delivery

## 2025-02-06 NOTE — DIAGNOSTIC IMAGING REPORT
----- Message from Davina sent at 2/6/2025  3:29 PM CST -----  Pt would like to know if she needs labs before her appt   [phone]   KUB



CLINICAL HISTORY: Abdominal pain.    



COMPARISON STUDY:  None. 



FINDINGS: The bowel gas pattern is normal. There is a moderate amount of stool

within the colon.

Two left pelvic calcifications measure up to 6 mm. A 7 mm calcific density

projects over the right mid pelvis.



IMPRESSION:  



1. Several pelvic calcifications which are indeterminate and could reflect

phleboliths or distal ureteral calculi.



2. No evidence of bowel obstruction.







Electronically signed by:  Augustin Martinez M.D.

4/7/2017 7:31 AM



Dictated Date/Time:  4/7/2017 7:30 AM